# Patient Record
Sex: FEMALE | Race: WHITE | NOT HISPANIC OR LATINO | Employment: UNEMPLOYED | URBAN - METROPOLITAN AREA
[De-identification: names, ages, dates, MRNs, and addresses within clinical notes are randomized per-mention and may not be internally consistent; named-entity substitution may affect disease eponyms.]

---

## 2020-07-17 ENCOUNTER — LAB VISIT (OUTPATIENT)
Dept: PRIMARY CARE CLINIC | Facility: OTHER | Age: 25
End: 2020-07-17
Attending: INTERNAL MEDICINE
Payer: OTHER GOVERNMENT

## 2020-07-17 DIAGNOSIS — Z11.59 SPECIAL SCREENING EXAMINATION FOR UNSPECIFIED VIRAL DISEASE: Primary | ICD-10-CM

## 2020-07-17 PROCEDURE — U0003 INFECTIOUS AGENT DETECTION BY NUCLEIC ACID (DNA OR RNA); SEVERE ACUTE RESPIRATORY SYNDROME CORONAVIRUS 2 (SARS-COV-2) (CORONAVIRUS DISEASE [COVID-19]), AMPLIFIED PROBE TECHNIQUE, MAKING USE OF HIGH THROUGHPUT TECHNOLOGIES AS DESCRIBED BY CMS-2020-01-R: HCPCS

## 2020-07-22 DIAGNOSIS — U07.1 COVID-19 VIRUS DETECTED: ICD-10-CM

## 2020-07-22 LAB — SARS-COV-2 RNA RESP QL NAA+PROBE: POSITIVE

## 2020-07-29 ENCOUNTER — HOSPITAL ENCOUNTER (EMERGENCY)
Facility: HOSPITAL | Age: 25
Discharge: HOME | End: 2020-07-29
Attending: EMERGENCY MEDICINE
Payer: OTHER GOVERNMENT

## 2020-07-29 VITALS
DIASTOLIC BLOOD PRESSURE: 75 MMHG | OXYGEN SATURATION: 100 % | RESPIRATION RATE: 18 BRPM | SYSTOLIC BLOOD PRESSURE: 115 MMHG | BODY MASS INDEX: 33.13 KG/M2 | TEMPERATURE: 99 F | HEIGHT: 63 IN | WEIGHT: 187 LBS | HEART RATE: 76 BPM

## 2020-07-29 DIAGNOSIS — R52 PAIN: ICD-10-CM

## 2020-07-29 DIAGNOSIS — S83.92XA SPRAIN OF LEFT KNEE, UNSPECIFIED LIGAMENT, INITIAL ENCOUNTER: Primary | ICD-10-CM

## 2020-07-29 LAB
B-HCG UR QL: NEGATIVE
CTP QC/QA: YES

## 2020-07-29 PROCEDURE — 25000003 PHARM REV CODE 250: Performed by: EMERGENCY MEDICINE

## 2020-07-29 PROCEDURE — 99285 EMERGENCY DEPT VISIT HI MDM: CPT | Mod: 25

## 2020-07-29 PROCEDURE — 81025 URINE PREGNANCY TEST: CPT | Performed by: EMERGENCY MEDICINE

## 2020-07-29 RX ORDER — MELOXICAM 15 MG/1
15 TABLET ORAL DAILY
Qty: 10 TABLET | Refills: 0 | Status: SHIPPED | OUTPATIENT
Start: 2020-07-29

## 2020-07-29 RX ORDER — IBUPROFEN 400 MG/1
400 TABLET ORAL
Status: COMPLETED | OUTPATIENT
Start: 2020-07-29 | End: 2020-07-29

## 2020-07-29 RX ADMIN — IBUPROFEN 400 MG: 400 TABLET ORAL at 03:07

## 2020-07-29 NOTE — ED TRIAGE NOTES
Pt states since she woke up two days ago, her L knee has been swollen and painful. Had surgery on affected knee 10 years ago.

## 2020-07-29 NOTE — ED PROVIDER NOTES
Encounter Date: 7/29/2020       History     Chief Complaint   Patient presents with    Knee Pain     L knee- denies injury     24-year-old well-appearing female presents to the emergency department with complaint of pain to the left medial aspect of her knee.  Patient denies any known injury.  She recently tested positive for the COVID virus she denies any calf tenderness or swelling.  She has had a previous surgeries the left knee.        Review of patient's allergies indicates:   Allergen Reactions    Morphine Hives    Morpholine analogues      Past Medical History:   Diagnosis Date    Anxiety      No past surgical history on file.  No family history on file.  Social History     Tobacco Use    Smoking status: Current Every Day Smoker     Packs/day: 1.00     Types: Cigarettes   Substance Use Topics    Alcohol use: Yes    Drug use: Not on file     Review of Systems   Musculoskeletal:        Left knee pain    All other systems reviewed and are negative.      Physical Exam     Initial Vitals [07/29/20 1312]   BP Pulse Resp Temp SpO2   (!) 143/62 85 18 98 °F (36.7 °C) 97 %      MAP       --         Physical Exam    Nursing note and vitals reviewed.  Constitutional: She appears well-developed and well-nourished.   Cardiovascular: Normal rate and regular rhythm.   Pulmonary/Chest: Breath sounds normal.   Musculoskeletal:      Left knee: She exhibits swelling. She exhibits normal range of motion, no effusion, no bony tenderness and normal meniscus. Tenderness found. Medial joint line tenderness noted.        Legs:       Comments: Negative Homans   Skin: Skin is warm and dry.         ED Course   Splint Application    Date/Time: 7/29/2020 3:25 PM  Performed by: DELFINO Abdi  Authorized by: Samir Lafleur MD   Location details: left knee  Supplies used: aluminum splint  Post-procedure: The splinted body part was neurovascularly unchanged following the procedure.  Patient tolerance: Patient tolerated the  procedure well with no immediate complications        Labs Reviewed   POCT URINE PREGNANCY          Imaging Results          US Lower Extremity Veins Left (In process)                X-Ray Knee Complete 4 or more Views Left (Final result)  Result time 07/29/20 14:21:05   Procedure changed from X-Ray Knee 3 View Left     Final result by Florentin Queen MD (07/29/20 14:21:05)                 Narrative:    XR KNEE COMP 4 OR MORE VIEWS LEFT    CLINICAL HISTORY:  24 years Female pain    COMPARISON: None    FINDINGS: No acute fracture or malalignment of the left knee. Joint  spaces are maintained. Linear tracks through the tibial tuberosity  consistent with prior fixation hardware placement. No joint effusion.    IMPRESSION:    No acute osseous abnormality.    Postoperative changes of the tibial tuberosity.    Electronically Signed by Florentin MCINTOSH on 7/29/2020 2:24 PM                               Medical Decision Making:   Initial Assessment:   Knee pain   Differential Diagnosis:   DVT , knee sprain  ED Management:  The patient presents emergency department with complaint of pain to the left medial aspect of her knee she has had a previous surgery to the same knee.  Patient has recently tested positive for the COVID virus she denies any calf tenderness and her Homans test is negative however because she has a COVID ultrasound performed which is negative for DVT.  Patient instructed follow up with orthopedist intake Motrin Tylenol for pain she was given detailed return precautions.              Attending Attestation:     Physician Attestation Statement for NP/PA:   I discussed this assessment and plan of this patient with the NP/PA, but I did not personally examine the patient. The face to face encounter was performed by the NP/PA.                  ED Course as of Jul 29 1741 Wed Jul 29, 2020   1530 Patient angry because she cant get a MRI Dr Lafleur asked to see the patient     [MP]      ED Course User  Index  [MP] DELFINO Abdi                Clinical Impression:       ICD-10-CM ICD-9-CM   1. Sprain of left knee, unspecified ligament, initial encounter  S83.92XA 844.9   2. Pain  R52 780.96                                DELFINO Abdi  07/29/20 1500       DELFINO Abdi  07/29/20 1526       DELFINO Abdi  07/29/20 1741       Samir Lafleur MD  07/30/20 6924

## 2020-08-19 ENCOUNTER — HOSPITAL ENCOUNTER (EMERGENCY)
Facility: HOSPITAL | Age: 25
Discharge: HOME | End: 2020-08-19
Attending: EMERGENCY MEDICINE
Payer: OTHER GOVERNMENT

## 2020-08-19 VITALS
DIASTOLIC BLOOD PRESSURE: 94 MMHG | BODY MASS INDEX: 33.66 KG/M2 | WEIGHT: 190 LBS | HEART RATE: 80 BPM | RESPIRATION RATE: 18 BRPM | SYSTOLIC BLOOD PRESSURE: 141 MMHG | TEMPERATURE: 98 F | OXYGEN SATURATION: 98 %

## 2020-08-19 DIAGNOSIS — S93.402A SPRAIN OF LEFT ANKLE, UNSPECIFIED LIGAMENT, INITIAL ENCOUNTER: Primary | ICD-10-CM

## 2020-08-19 DIAGNOSIS — M25.572 LEFT ANKLE PAIN: ICD-10-CM

## 2020-08-19 PROCEDURE — 99283 EMERGENCY DEPT VISIT LOW MDM: CPT | Mod: 25

## 2020-08-19 PROCEDURE — 25000003 PHARM REV CODE 250: Performed by: EMERGENCY MEDICINE

## 2020-08-19 RX ORDER — OXYCODONE HYDROCHLORIDE 10 MG/1
10 TABLET ORAL
Status: COMPLETED | OUTPATIENT
Start: 2020-08-19 | End: 2020-08-19

## 2020-08-19 RX ORDER — DICLOFENAC SODIUM 50 MG/1
50 TABLET, DELAYED RELEASE ORAL 3 TIMES DAILY
Qty: 15 TABLET | Refills: 0 | Status: SHIPPED | OUTPATIENT
Start: 2020-08-19 | End: 2021-08-19

## 2020-08-19 RX ADMIN — OXYCODONE HYDROCHLORIDE 10 MG: 10 TABLET ORAL at 12:08

## 2020-08-19 NOTE — ED NOTES
presents to room 6 with complaints of left ankle and foot pain sfter fall No obvious deformities swelling or discoloration not Examined by Dr Lindo X ray at bedside NAD noted

## 2020-08-19 NOTE — ED PROVIDER NOTES
Encounter Date: 8/19/2020    SCRIBE #1 NOTE: I, La Perez, am scribing for, and in the presence of, Ismael Lindo MD.       History     Chief Complaint   Patient presents with    Ankle Injury     S/p trip and fall; presents with left ankle pain     Time seen by provider: 12:24 PM on 08/19/2020    Chief complaint: Ankle injury    Stephanie Moore is a 24 y.o. female who presents to the ED with an onset of ankle injury. The patient complains of pain to her left ankle secondary to tripping and falling onto her ankle while at work [employer]. Patient admits she was wearing high-heeled shoes. She also endures pain in her leg knee but states she sprained it a month ago. The patient denies any other symptoms at this time. PMHx of anxiety. No pertinent PSHx.    The history is provided by the patient.     Review of patient's allergies indicates:   Allergen Reactions    Morphine Hives    Morpholine analogues      Past Medical History:   Diagnosis Date    Anxiety      No past surgical history on file.  No family history on file.  Social History     Tobacco Use    Smoking status: Current Every Day Smoker     Packs/day: 1.00     Types: Cigarettes   Substance Use Topics    Alcohol use: Yes    Drug use: Not on file     Review of Systems   Constitutional: Negative for activity change, appetite change, chills, fatigue and fever.   Eyes: Negative for visual disturbance.   Respiratory: Negative for apnea and shortness of breath.    Cardiovascular: Negative for chest pain and palpitations.   Gastrointestinal: Negative for abdominal distention and abdominal pain.   Genitourinary: Negative for difficulty urinating.   Musculoskeletal: Positive for arthralgias. Negative for neck pain.   Skin: Negative for pallor and rash.   Neurological: Negative for headaches.   Hematological: Does not bruise/bleed easily.   Psychiatric/Behavioral: Negative for agitation.       Physical Exam     Initial Vitals [08/19/20 1222]   BP Pulse Resp Temp  SpO2   (!) 141/94 80 16 97.6 °F (36.4 °C) 98 %      MAP       --         Physical Exam    Nursing note and vitals reviewed.  Constitutional: She appears well-developed and well-nourished.   HENT:   Head: Normocephalic and atraumatic.   Eyes: Conjunctivae are normal.   Neck: Normal range of motion. Neck supple.   Cardiovascular: Normal rate, regular rhythm and normal heart sounds. Exam reveals no gallop and no friction rub.    No murmur heard.  Pulmonary/Chest: Effort normal and breath sounds normal. No respiratory distress. She has no wheezes. She has no rhonchi. She has no rales.   Abdominal: Soft. She exhibits no distension. There is no abdominal tenderness.   Musculoskeletal: Normal range of motion.        Left ankle: She exhibits swelling. Tenderness. Lateral malleolus tenderness found.      Comments: Tenderness with mild swelling of left lateral malleolus.   Neurological: She is alert and oriented to person, place, and time.   Skin: Skin is warm and dry. No erythema.   Psychiatric: She has a normal mood and affect.         ED Course   Procedures  Labs Reviewed - No data to display       Imaging Results          X-Ray Ankle Complete Left (Final result)  Result time 08/19/20 12:43:58    Final result by Loida Fierro MD (08/19/20 12:43:58)                 Impression:      As above      Electronically signed by: Loida Fierro MD  Date:    08/19/2020  Time:    12:43             Narrative:    EXAMINATION:  XR ANKLE COMPLETE 3 VIEW LEFT    CLINICAL HISTORY:  Pain in left ankle and joints of left foot    TECHNIQUE:  AP, lateral and oblique views of the left ankle were performed.    COMPARISON:  None    FINDINGS:  No acute fracture or dislocation left ankle.                                 Medical Decision Making:   History:   Old Medical Records: I decided to obtain old medical records.  Clinical Tests:   Radiological Study: Ordered and Reviewed  ED Management:  24-year-old female presents with left ankle pain  after a fall.  X-rays independently interpreted by me failed to demonstrate any evidence of fracture or dislocation.  She is placed in a walking boot.       APC / Resident Notes:   I, Dr. Ismael Lindo III, personally performed the services described in this documentation. All medical record entries made by the scribe were at my direction and in my presence.  I have reviewed the chart and agree that the record reflects my personal performance and is accurate and complete       Scribe Attestation:   Scribe #1: I performed the above scribed service and the documentation accurately describes the services I performed. I attest to the accuracy of the note.                          Clinical Impression:       ICD-10-CM ICD-9-CM   1. Left ankle pain  M25.572 719.47         Disposition:   Disposition: Discharged  Condition: Stable                        Ismael Lindo III, MD  08/19/20 0032

## 2021-04-29 ENCOUNTER — PATIENT MESSAGE (OUTPATIENT)
Dept: RESEARCH | Facility: HOSPITAL | Age: 26
End: 2021-04-29

## 2021-07-01 ENCOUNTER — HOSPITAL ENCOUNTER (EMERGENCY)
Facility: HOSPITAL | Age: 26
Discharge: HOME | End: 2021-07-01
Attending: EMERGENCY MEDICINE

## 2021-07-01 VITALS
DIASTOLIC BLOOD PRESSURE: 94 MMHG | WEIGHT: 200 LBS | HEIGHT: 63 IN | OXYGEN SATURATION: 99 % | BODY MASS INDEX: 35.44 KG/M2 | TEMPERATURE: 99 F | RESPIRATION RATE: 18 BRPM | SYSTOLIC BLOOD PRESSURE: 139 MMHG | HEART RATE: 95 BPM

## 2021-07-01 DIAGNOSIS — M25.461 EFFUSION OF RIGHT KNEE: Primary | ICD-10-CM

## 2021-07-01 DIAGNOSIS — R52 PAIN: ICD-10-CM

## 2021-07-01 LAB
B-HCG UR QL: NEGATIVE
CTP QC/QA: YES

## 2021-07-01 PROCEDURE — 99283 EMERGENCY DEPT VISIT LOW MDM: CPT

## 2021-07-01 PROCEDURE — 25000003 PHARM REV CODE 250: Performed by: NURSE PRACTITIONER

## 2021-07-01 PROCEDURE — 81025 URINE PREGNANCY TEST: CPT | Performed by: NURSE PRACTITIONER

## 2021-07-01 RX ORDER — HYDROCODONE BITARTRATE AND ACETAMINOPHEN 5; 325 MG/1; MG/1
1 TABLET ORAL
Status: COMPLETED | OUTPATIENT
Start: 2021-07-01 | End: 2021-07-01

## 2021-07-01 RX ORDER — HYDROCODONE BITARTRATE AND ACETAMINOPHEN 5; 325 MG/1; MG/1
1 TABLET ORAL EVERY 4 HOURS PRN
Qty: 15 TABLET | Refills: 0 | Status: SHIPPED | OUTPATIENT
Start: 2021-07-01

## 2021-07-01 RX ADMIN — HYDROCODONE BITARTRATE AND ACETAMINOPHEN 1 TABLET: 5; 325 TABLET ORAL at 08:07

## 2024-01-03 DIAGNOSIS — Z32.01 ENCOUNTER FOR PREGNANCY TEST, RESULT POSITIVE: Primary | ICD-10-CM

## 2024-01-04 ENCOUNTER — HOSPITAL ENCOUNTER (EMERGENCY)
Facility: HOSPITAL | Age: 29
Discharge: HOME | End: 2024-01-04
Attending: EMERGENCY MEDICINE
Payer: MEDICAID

## 2024-01-04 VITALS
WEIGHT: 207 LBS | HEIGHT: 63 IN | RESPIRATION RATE: 16 BRPM | BODY MASS INDEX: 36.68 KG/M2 | TEMPERATURE: 99 F | SYSTOLIC BLOOD PRESSURE: 130 MMHG | HEART RATE: 80 BPM | DIASTOLIC BLOOD PRESSURE: 80 MMHG | OXYGEN SATURATION: 98 %

## 2024-01-04 DIAGNOSIS — B34.9 VIRAL SYNDROME: Primary | ICD-10-CM

## 2024-01-04 DIAGNOSIS — R10.9 ABDOMINAL PAIN: ICD-10-CM

## 2024-01-04 DIAGNOSIS — Z34.92 SECOND TRIMESTER PREGNANCY: ICD-10-CM

## 2024-01-04 LAB
ALBUMIN SERPL BCP-MCNC: 3.6 G/DL (ref 3.5–5.2)
ALP SERPL-CCNC: 122 U/L (ref 55–135)
ALT SERPL W/O P-5'-P-CCNC: 37 U/L (ref 10–44)
ANION GAP SERPL CALC-SCNC: 11 MMOL/L (ref 8–16)
AST SERPL-CCNC: 24 U/L (ref 10–40)
B-HCG UR QL: POSITIVE
BASOPHILS # BLD AUTO: 0.02 K/UL (ref 0–0.2)
BASOPHILS NFR BLD: 0.2 % (ref 0–1.9)
BILIRUB SERPL-MCNC: 0.3 MG/DL (ref 0.1–1)
BILIRUB UR QL STRIP: NEGATIVE
BUN SERPL-MCNC: 5 MG/DL (ref 6–20)
CALCIUM SERPL-MCNC: 9.1 MG/DL (ref 8.7–10.5)
CHLORIDE SERPL-SCNC: 104 MMOL/L (ref 95–110)
CLARITY UR: CLEAR
CO2 SERPL-SCNC: 19 MMOL/L (ref 23–29)
COLOR UR: YELLOW
CREAT SERPL-MCNC: 0.5 MG/DL (ref 0.5–1.4)
CTP QC/QA: YES
DIFFERENTIAL METHOD BLD: ABNORMAL
EOSINOPHIL # BLD AUTO: 0 K/UL (ref 0–0.5)
EOSINOPHIL NFR BLD: 0.2 % (ref 0–8)
ERYTHROCYTE [DISTWIDTH] IN BLOOD BY AUTOMATED COUNT: 13.7 % (ref 11.5–14.5)
EST. GFR  (NO RACE VARIABLE): >60 ML/MIN/1.73 M^2
FIBRONECTIN FETAL SPEC QL: NEGATIVE
GLUCOSE SERPL-MCNC: 121 MG/DL (ref 70–110)
GLUCOSE UR QL STRIP: NEGATIVE
HCG INTACT+B SERPL-ACNC: NORMAL MIU/ML
HCT VFR BLD AUTO: 33.3 % (ref 37–48.5)
HGB BLD-MCNC: 11.4 G/DL (ref 12–16)
HGB UR QL STRIP: NEGATIVE
IMM GRANULOCYTES # BLD AUTO: 0.09 K/UL (ref 0–0.04)
IMM GRANULOCYTES NFR BLD AUTO: 1 % (ref 0–0.5)
INFLUENZA A, MOLECULAR: NEGATIVE
INFLUENZA B, MOLECULAR: NEGATIVE
KETONES UR QL STRIP: NEGATIVE
LEUKOCYTE ESTERASE UR QL STRIP: NEGATIVE
LIPASE SERPL-CCNC: 6 U/L (ref 4–60)
LYMPHOCYTES # BLD AUTO: 0.8 K/UL (ref 1–4.8)
LYMPHOCYTES NFR BLD: 8.3 % (ref 18–48)
MCH RBC QN AUTO: 30.5 PG (ref 27–31)
MCHC RBC AUTO-ENTMCNC: 34.2 G/DL (ref 32–36)
MCV RBC AUTO: 89 FL (ref 82–98)
MONOCYTES # BLD AUTO: 0.7 K/UL (ref 0.3–1)
MONOCYTES NFR BLD: 7 % (ref 4–15)
NEUTROPHILS # BLD AUTO: 7.7 K/UL (ref 1.8–7.7)
NEUTROPHILS NFR BLD: 83.3 % (ref 38–73)
NITRITE UR QL STRIP: NEGATIVE
NRBC BLD-RTO: 0 /100 WBC
PH UR STRIP: 7 [PH] (ref 5–8)
PLATELET # BLD AUTO: 280 K/UL (ref 150–450)
PMV BLD AUTO: 10.2 FL (ref 9.2–12.9)
POTASSIUM SERPL-SCNC: 3.6 MMOL/L (ref 3.5–5.1)
PROT SERPL-MCNC: 6.7 G/DL (ref 6–8.4)
PROT UR QL STRIP: NEGATIVE
RBC # BLD AUTO: 3.74 M/UL (ref 4–5.4)
RH BLD: NORMAL
SARS-COV-2 RDRP RESP QL NAA+PROBE: NEGATIVE
SODIUM SERPL-SCNC: 134 MMOL/L (ref 136–145)
SP GR UR STRIP: 1.01 (ref 1–1.03)
SPECIMEN SOURCE: NORMAL
URN SPEC COLLECT METH UR: NORMAL
UROBILINOGEN UR STRIP-ACNC: NEGATIVE EU/DL
WBC # BLD AUTO: 9.29 K/UL (ref 3.9–12.7)

## 2024-01-04 PROCEDURE — 96361 HYDRATE IV INFUSION ADD-ON: CPT

## 2024-01-04 PROCEDURE — 86901 BLOOD TYPING SEROLOGIC RH(D): CPT | Performed by: EMERGENCY MEDICINE

## 2024-01-04 PROCEDURE — U0002 COVID-19 LAB TEST NON-CDC: HCPCS | Performed by: EMERGENCY MEDICINE

## 2024-01-04 PROCEDURE — 36415 COLL VENOUS BLD VENIPUNCTURE: CPT | Performed by: EMERGENCY MEDICINE

## 2024-01-04 PROCEDURE — 83690 ASSAY OF LIPASE: CPT | Performed by: EMERGENCY MEDICINE

## 2024-01-04 PROCEDURE — 85025 COMPLETE CBC W/AUTO DIFF WBC: CPT | Performed by: EMERGENCY MEDICINE

## 2024-01-04 PROCEDURE — 84702 CHORIONIC GONADOTROPIN TEST: CPT | Performed by: EMERGENCY MEDICINE

## 2024-01-04 PROCEDURE — 81025 URINE PREGNANCY TEST: CPT | Performed by: EMERGENCY MEDICINE

## 2024-01-04 PROCEDURE — 99284 EMERGENCY DEPT VISIT MOD MDM: CPT | Mod: 25

## 2024-01-04 PROCEDURE — 87502 INFLUENZA DNA AMP PROBE: CPT | Performed by: EMERGENCY MEDICINE

## 2024-01-04 PROCEDURE — 81003 URINALYSIS AUTO W/O SCOPE: CPT | Performed by: EMERGENCY MEDICINE

## 2024-01-04 PROCEDURE — 82731 ASSAY OF FETAL FIBRONECTIN: CPT | Performed by: OBSTETRICS & GYNECOLOGY

## 2024-01-04 PROCEDURE — 63600175 PHARM REV CODE 636 W HCPCS: Performed by: EMERGENCY MEDICINE

## 2024-01-04 PROCEDURE — 96360 HYDRATION IV INFUSION INIT: CPT

## 2024-01-04 PROCEDURE — 80053 COMPREHEN METABOLIC PANEL: CPT | Performed by: EMERGENCY MEDICINE

## 2024-01-04 RX ADMIN — SODIUM CHLORIDE, SODIUM LACTATE, POTASSIUM CHLORIDE, AND CALCIUM CHLORIDE 1000 ML: .6; .31; .03; .02 INJECTION, SOLUTION INTRAVENOUS at 11:01

## 2024-01-04 NOTE — ED PROVIDER NOTES
"Encounter Date: 1/4/2024       History     Chief Complaint   Patient presents with    Fever     Fever, cough, runny nose starting today, pregnant but unknown gestation     Abdominal Pain     Abd pain starting today     Patient complains of pains to the lower abdomen, has a fluctuating fever to 102, cough and runny nose, has vomited only once today, no hx of dvt, PE. Denies chest pain, has slight chest discomfort, is uncomfortable, no chest pain, "it doesn't hurt"        Review of patient's allergies indicates:   Allergen Reactions    Morphine Hives    Morpholine analogues      Past Medical History:   Diagnosis Date    Anxiety      No past surgical history on file.  No family history on file.  Social History     Tobacco Use    Smoking status: Every Day     Current packs/day: 1.00     Types: Cigarettes   Substance Use Topics    Alcohol use: Yes     Review of Systems   Constitutional:  Negative for chills and fever.   HENT:  Negative for ear pain, rhinorrhea and sore throat.    Eyes:  Negative for pain and visual disturbance.   Respiratory:  Positive for cough and shortness of breath. Negative for chest tightness.    Cardiovascular:  Negative for chest pain, palpitations and leg swelling.   Gastrointestinal:  Positive for abdominal pain. Negative for constipation, diarrhea, nausea and vomiting.   Genitourinary:  Negative for difficulty urinating, dysuria, frequency, hematuria and urgency.   Musculoskeletal:  Negative for back pain, joint swelling and myalgias.   Skin:  Negative for color change and rash.   Neurological:  Negative for dizziness, seizures, weakness and headaches.   Hematological:  Does not bruise/bleed easily.   Psychiatric/Behavioral:  Negative for dysphoric mood. The patient is not nervous/anxious.        Physical Exam     Initial Vitals [01/04/24 0108]   BP Pulse Resp Temp SpO2   (!) 145/87 (!) 125 18 99.3 °F (37.4 °C) 98 %      MAP       --         Physical Exam    Nursing note and vitals " reviewed.  Constitutional: She appears well-developed and well-nourished.   HENT:   Head: Normocephalic and atraumatic.   Eyes: Conjunctivae, EOM and lids are normal. Pupils are equal, round, and reactive to light.   Neck: Trachea normal. Neck supple. No thyroid mass and no thyromegaly present.   Normal range of motion.  Cardiovascular:  Normal rate, regular rhythm and normal heart sounds.           Pulmonary/Chest: Effort normal and breath sounds normal.   Abdominal: Abdomen is soft. There is no abdominal tenderness.   Musculoskeletal:         General: Normal range of motion.      Cervical back: Normal range of motion and neck supple.     Neurological: She is alert and oriented to person, place, and time. She has normal strength and normal reflexes. No cranial nerve deficit or sensory deficit.   Skin: Skin is warm and dry.   Psychiatric: She has a normal mood and affect. Her speech is normal and behavior is normal. Judgment and thought content normal.         ED Course   Procedures  Labs Reviewed   CBC W/ AUTO DIFFERENTIAL - Abnormal; Notable for the following components:       Result Value    RBC 3.74 (*)     Hemoglobin 11.4 (*)     Hematocrit 33.3 (*)     Immature Granulocytes 1.0 (*)     Immature Grans (Abs) 0.09 (*)     Lymph # 0.8 (*)     Gran % 83.3 (*)     Lymph % 8.3 (*)     All other components within normal limits   COMPREHENSIVE METABOLIC PANEL - Abnormal; Notable for the following components:    Sodium 134 (*)     CO2 19 (*)     Glucose 121 (*)     BUN 5 (*)     All other components within normal limits   POCT URINE PREGNANCY - Abnormal; Notable for the following components:    POC Preg Test, Ur Positive (*)     All other components within normal limits   INFLUENZA A AND B ANTIGEN    Narrative:     Specimen Source->Nasopharyngeal Swab   SARS-COV-2 RNA AMPLIFICATION, QUAL   URINALYSIS, REFLEX TO URINE CULTURE    Narrative:     Specimen Source->Urine   LIPASE   HCG, QUANTITATIVE   HCG, QUANTITATIVE    FETAL FIBRONECTIN    Narrative:     Gestational Age: Weeks/Day->24.3   RH TYPING          Imaging Results              US OB 14+ Wks, TransAbd, Single Gestation (Final result)  Result time 01/04/24 07:50:07   Procedure changed from US OB <14 Wks, TransAbd, Single Gestation     Final result by Parminder Rivas MD (01/04/24 07:50:07)                   Narrative:    Ultrasound pregnancy limited    CLINICAL DATA: Pregnancy, abdominal pain    FINDINGS: Sonographic assessment of the gravid uterus was performed utilizing transabdominal technique.    Single living intrauterine fetus is currently in breech presentation with spontaneous fetal motion and fetal heart rate of 162 bpm. The placenta is posterior with no evidence of previa or abruption. The cervix is closed and measures 4.3 cm in length. Amniotic fluid index is 13.1 cm.    By sonographic criteria, estimated gestational age is 24 weeks 3 days +/- 1 week 5 days. Detailed morphologic survey was not performed.    The maternal ovaries are normal in appearance. There is no pelvic free fluid.    IMPRESSION:  1. Living intrauterine fetus in breech presentation. Estimated gestational age 24 weeks 3 days.  2. Posterior placenta with no previa or abruption.    Electronically signed by:  Parminder Rivas MD  01/04/2024 07:50 AM CST Workstation: [id number]                                     Medications   lactated ringers bolus 1,000 mL (1,000 mLs Intravenous New Bag 1/4/24 1123)     Medical Decision Making  Amount and/or Complexity of Data Reviewed  Labs: ordered.  Radiology: ordered.               ED Course as of 01/04/24 1418   Thu Jan 04, 2024   1411 Patient seen evaluated emergency department.  Patient initially here with complaint of abdominal cramping.  Patient found to be pregnant however stated that she did not know her last menstrual period.  Patient workup in emergency department found with 24 week 5 day single live intrauterine pregnancy with breech position.   Patient stated that she had flu-like symptoms.  Found to be COVID and influenza negative.  Patient was Rh positive.  Was placed on fetal monitor which showed no evidence of active labor.  Fetal fibronectin found to be negative.  Patient did receive IV hydration emergency department.  Currently at this time patient to be discharged with follow-up to [facility] program.  Patient instructed to drink plenty of fluids.  She is return if problems persist worsens or additional. [RM]      ED Course User Index  [RM] Edson Mane MD                           Clinical Impression:  Final diagnoses:  [R10.9] Abdominal pain  [B34.9] Viral syndrome (Primary)  [Z34.92] Second trimester pregnancy          ED Disposition Condition    Discharge Stable          ED Prescriptions    None       Follow-up Information       Follow up With Specialties Details Why Contact Info    Alma Delia Rojas MD Obstetrics, Obstetrics and Gynecology Schedule an appointment as soon as possible for a visit in 1 week For recheck/continuing care [address]  [address]  [facility]  [facility] [address]  [phone]               Edson Mane MD  01/04/24 9445

## 2024-01-11 LAB
HIV 1+2 AB+HIV1 P24 AG SERPL QL IA: NORMAL
RPR: NON REACTIVE
RUBELLA IMMUNE STATUS: NORMAL

## 2024-03-13 ENCOUNTER — NUTRITION (OUTPATIENT)
Dept: NUTRITION | Facility: HOSPITAL | Age: 29
End: 2024-03-13
Attending: OBSTETRICS & GYNECOLOGY
Payer: MEDICAID

## 2024-03-13 DIAGNOSIS — O24.419 GESTATIONAL DIABETES MELLITUS (GDM), ANTEPARTUM, GESTATIONAL DIABETES METHOD OF CONTROL UNSPECIFIED: Primary | ICD-10-CM

## 2024-03-13 PROCEDURE — G0108 DIAB MANAGE TRN  PER INDIV: HCPCS

## 2024-03-13 NOTE — PROGRESS NOTES
"Diagnosis/Reason for Referral: Gestational Diabetes    Medical Nutrition Prescription: Diabetes Self Management Training        Referring professional: Dr. Dias    Anthropometrics  Height:   Ht Readings from Last 1 Encounters:   01/04/24 5' 3" (1.6 m)     Weight:   Wt Readings from Last 1 Encounters:   01/04/24 93.9 kg (207 lb)      BMI:   BMI Readings from Last 1 Encounters:   01/04/24 36.67 kg/m²        Weight History:  Wt Readings from Last 5 Encounters:   01/04/24 93.9 kg (207 lb)   07/01/21 90.7 kg (200 lb)   08/19/20 86.2 kg (190 lb)   07/29/20 84.8 kg (187 lb)   02/17/16 72.6 kg (160 lb)        Caloric needs: 7031-8942 (20-25kcal/kg) + 500 kcals for pregnancy  Protein needs: 75-98gm (0.8-1gm/kg)     Potential food/medication interaction: none, only taking iron and prenatal    Support system: family    Lifestyle/cultural/family influence: none    NFPE: N/A    Social Determinants of Health: SDOH: N/A    Diabetes Care Management Summary   Diabetes Education Record Assessment/Progress Initial   Current Diabetes Risk Level Low   Diabetes Type   Diabetes Type  Gestational   Diabetes History   Diabetes Diagnosis 0-1 year   Current Treatment Diet   Nutrition   Meal Planning water;3 meals per day;diet drinks;eats out seldom   What type of sweetener do you use? none   What type of beverages do you drink? diet soda/tea;water   Meal Plan 24 Hour Recall - Breakfast cabbage and sausage   Meal Plan 24 Hour Recall - Lunch cabbage and sausage   Meal Plan 24 Hour Recall - Dinner rice and beans   Meal Plan 24 Hour Recall - Snack Beef jerky   Monitoring    Self Monitoring  yes   Blood Glucose Logs Yes   Do you use a personal continuous glucose monitor? No   In the last month, how often have you had a low blood sugar reaction? never   Exercise    Exercise Type none   Social History   Preferred Learning Method Face to Face;Video   Primary Support Self;Spouse   Smoking Status Ex Smoker   Barriers to Change   Barriers to Change " None   Learning Challenges  None   Readiness to Learn    Readiness to Learn  Eager   Diabetes Education Assessment/Progress   Diabetes Disease Process (diabetes disease process and treatment options) Instructed/patient voiced/demonstrated understanding/Written Materials provided   Nutrition (Incorporating nutritional management into one's lifestyle) Instructed/patient voiced/demonstrated understanding/Written Materials provided   Physical Activity (incorporating physical activity into one's lifestyle) Instructed/patient voiced/demonstrated understanding/Written Materials provided   Monitoring (monitoring blood glucose/other parameters & using results) Instructed/patient voiced/demonstrated understanding/Written Materials provided   Acute Complications (preventing, detecting, and treating acute complications) Instructed/patient voiced/demonstrated understanding/Written Materials provided   Goals   Patient has selected/evaluated goals during today's session Yes, selected   Healthy Eating Set   Start Date 03/13/24   Monitoring Set   Start Date 03/13/24   Problem Solving Set   Start Date 03/13/24   Diabetes Care Plan/Intervention   Education Plan/Intervention In F/U DSMT   Diabetes Meal Plan   Restrictions Restricted Carbohydrate   Carbohydrate Per Meal 30-45g   Carbohydrate Per Snack  15-20g   Education Units of Time    Time Spent 60 min       Instructions Provided:   Definition and Diagnosis    Nutrition and Meal Planning    Support Plan/Coping Skills  Food label reading  Carbohydrate counting  Low carb snacks   Blood sugar monitoring  Hypoglycemia Management  Sick day guidelines  Sharps disposal     Comments:    I reviewed all of the above with patient. Patient has followed a low carb diet in the past and is familiar with carbohydrates. RD reviewed patient's blood sugar logs, fasting is fluctuating between 87-130mgd/L. All post prandial results are <150mg/dL. We discussed adding a bedtime snack, increasing physical  activity after meals and checking blood sugar at the same time every morning. Encouraged patient to make notes of routine the night before, snacks, waking up frequently during the night, eating during the night and dehydration since all these can be affect her fasting blood sugar. Low physical activity d/t pain and feeling tired.     We also discussed eating 30-45 gm of carbohydrates per meal and 15-20gm carbohydrates per snack and not avoiding carbohydrates during pregnancy. Encouraged patient to continue to check blood sugar 4x/day and adjust carbohydrate intake accordingly.     Education materials and contact information provided for questions.     Nutrition Diagnosis PES Statement: Food- and Nutrition-related knowledge deficit related to lack of prior exposure to accurate nutritionrelated information as evidenced by new diagnosis of gestational diabetes    Motivation: high     Goals:    1. Eat 30-45gm per meal and 15-20gm per snack of carbohydrates  2. Test blood sugars 4x/day and keep blood sugar log  3. Read food labels    Thank you for the referral.      Mago Sargent, MS, RD/LDN, CDCES    Note faxed to MD

## 2024-03-20 ENCOUNTER — TELEPHONE (OUTPATIENT)
Dept: NUTRITION | Facility: HOSPITAL | Age: 29
End: 2024-03-20

## 2024-03-20 NOTE — TELEPHONE ENCOUNTER
RD called patient to follow up on fasting blood sugars.     Patient reports that most of her fasting blood sugar results are <100mg/dL. She is doing well with her meals, just exhausted and feels that it is likely d/t being close to her delivery date.     No concerns at this time.     Mago Sargent RD 03/20/2024 2:07 PM

## 2024-03-21 LAB — PRENATAL STREP B CULTURE: NEGATIVE

## 2024-04-12 ENCOUNTER — OFFICE VISIT (OUTPATIENT)
Dept: URGENT CARE | Facility: CLINIC | Age: 29
End: 2024-04-12
Payer: MEDICAID

## 2024-04-12 VITALS
TEMPERATURE: 98 F | DIASTOLIC BLOOD PRESSURE: 89 MMHG | WEIGHT: 210 LBS | OXYGEN SATURATION: 98 % | RESPIRATION RATE: 18 BRPM | HEART RATE: 67 BPM | HEIGHT: 63 IN | BODY MASS INDEX: 37.21 KG/M2 | SYSTOLIC BLOOD PRESSURE: 137 MMHG

## 2024-04-12 DIAGNOSIS — J06.9 VIRAL URI WITH COUGH: Primary | ICD-10-CM

## 2024-04-12 DIAGNOSIS — Z20.822 COVID-19 VIRUS NOT DETECTED: ICD-10-CM

## 2024-04-12 DIAGNOSIS — J02.9 SORE THROAT: ICD-10-CM

## 2024-04-12 PROCEDURE — 99204 OFFICE O/P NEW MOD 45 MIN: CPT | Mod: S$GLB,,,

## 2024-04-12 NOTE — PROGRESS NOTES
"Subjective:      Patient ID: Stephanie Moore is a 28 y.o. female.    Vitals:  height is 5' 3" (1.6 m) and weight is 95.3 kg (210 lb). Her temperature is 98.3 °F (36.8 °C). Her blood pressure is 137/89 and her pulse is 67. Her respiration is 18 and oxygen saturation is 98%.     Chief Complaint: Sore Throat    Patient was clinic with a chief complaint of sore throat and cough for 2 days.  She states she wants to be treated for an infection prior to impending fetal delivery in 5 days.  Denies fever or ill contacts.  States gets frequent sinusitis and has associated nausea with sinusitis.  He was she has no facial pains, fever, rust colored mucus, or prolonged duration of illness.  Oropharynx noninfectious appearing.  No oropharyngeal exudate.  No cervical lymphadenopathy.  Tonsils are surgically absent.  She is reporting submandibular gland swelling.  Glands are symmetrical bilaterally.  Do not suspect sialadenitis    Sore Throat   This is a new problem. The current episode started in the past 7 days. The problem has been gradually worsening. There has been no fever. The pain is at a severity of 6/10. The pain is moderate. Associated symptoms include congestion, coughing, a plugged ear sensation, swollen glands and trouble swallowing. The treatment provided no relief.       Constitution: Negative for fever.   HENT:  Positive for congestion, sore throat and trouble swallowing. Negative for postnasal drip and sinus pain.    Neck: Negative for painful lymph nodes.   Cardiovascular: Negative.    Eyes: Negative.    Respiratory:  Positive for cough and sputum production (Green).    Gastrointestinal: Negative.    Endocrine: negative.   Genitourinary:  Positive for frequency and urgency.        Due to pregnancy   Musculoskeletal: Negative.    Skin: Negative.    Hematologic/Lymphatic: Negative for swollen lymph nodes.   Psychiatric/Behavioral: Negative.        Objective:     Physical Exam   Constitutional: She is oriented " to person, place, and time. She appears well-developed. She is cooperative.   HENT:   Head: Normocephalic and atraumatic.   Ears:   Right Ear: Hearing, tympanic membrane, external ear and ear canal normal.   Left Ear: Hearing, tympanic membrane, external ear and ear canal normal.   Nose: Nose normal. No mucosal edema or nasal deformity. No epistaxis. Right sinus exhibits no maxillary sinus tenderness and no frontal sinus tenderness. Left sinus exhibits no maxillary sinus tenderness and no frontal sinus tenderness.   Mouth/Throat: Uvula is midline, oropharynx is clear and moist and mucous membranes are normal. Mucous membranes are moist. No trismus in the jaw. Normal dentition. No uvula swelling. No oropharyngeal exudate or posterior oropharyngeal erythema. Oropharynx is clear.   Eyes: Conjunctivae and lids are normal. Pupils are equal, round, and reactive to light. Extraocular movement intact   Neck: Trachea normal and phonation normal. Neck supple.       Cardiovascular: Normal rate, regular rhythm, normal heart sounds and normal pulses.   Pulmonary/Chest: Effort normal and breath sounds normal.   Abdominal: Normal appearance.      Comments:  abdomen   Musculoskeletal: Normal range of motion.         General: Normal range of motion.   Lymphadenopathy:     She has no cervical adenopathy.   Neurological: no focal deficit. She is alert, oriented to person, place, and time and at baseline. She exhibits normal muscle tone.   Skin: Skin is warm, dry and intact. Capillary refill takes 2 to 3 seconds.   Psychiatric: Her speech is normal and behavior is normal. Mood, judgment and thought content normal.   Nursing note and vitals reviewed.      Assessment:     1. Viral URI with cough    2. Sore throat    3. COVID-19 virus not detected        Plan:       Viral URI with cough    Sore throat  -     POCT rapid strep A  -     SARS Coronavirus 2 Antigen, POCT Manual Read    COVID-19 virus not detected      Conservative  treatments.  No indication for antibiotics at this time.     Rapid strep negative

## 2024-04-17 ENCOUNTER — HOSPITAL ENCOUNTER (INPATIENT)
Facility: HOSPITAL | Age: 29
LOS: 3 days | Discharge: HOME | End: 2024-04-20
Attending: OBSTETRICS & GYNECOLOGY | Admitting: OBSTETRICS & GYNECOLOGY
Payer: MEDICAID

## 2024-04-17 ENCOUNTER — ANESTHESIA (OUTPATIENT)
Dept: OBSTETRICS AND GYNECOLOGY | Facility: HOSPITAL | Age: 29
End: 2024-04-17
Payer: MEDICAID

## 2024-04-17 ENCOUNTER — ANESTHESIA EVENT (OUTPATIENT)
Dept: OBSTETRICS AND GYNECOLOGY | Facility: HOSPITAL | Age: 29
End: 2024-04-17
Payer: MEDICAID

## 2024-04-17 DIAGNOSIS — Z34.90 PREGNANCY: ICD-10-CM

## 2024-04-17 DIAGNOSIS — Z30.2 STERILIZATION: ICD-10-CM

## 2024-04-17 LAB
BASOPHILS # BLD AUTO: 0.03 K/UL (ref 0–0.2)
BASOPHILS NFR BLD: 0.2 % (ref 0–1.9)
DIFFERENTIAL METHOD BLD: ABNORMAL
EOSINOPHIL # BLD AUTO: 0 K/UL (ref 0–0.5)
EOSINOPHIL NFR BLD: 0.1 % (ref 0–8)
ERYTHROCYTE [DISTWIDTH] IN BLOOD BY AUTOMATED COUNT: 14.3 % (ref 11.5–14.5)
HCT VFR BLD AUTO: 32.4 % (ref 37–48.5)
HGB BLD-MCNC: 10.5 G/DL (ref 12–16)
IMM GRANULOCYTES # BLD AUTO: 0.08 K/UL (ref 0–0.04)
IMM GRANULOCYTES NFR BLD AUTO: 0.5 % (ref 0–0.5)
LYMPHOCYTES # BLD AUTO: 1.2 K/UL (ref 1–4.8)
LYMPHOCYTES NFR BLD: 7.1 % (ref 18–48)
MCH RBC QN AUTO: 28.2 PG (ref 27–31)
MCHC RBC AUTO-ENTMCNC: 32.4 G/DL (ref 32–36)
MCV RBC AUTO: 87 FL (ref 82–98)
MONOCYTES # BLD AUTO: 0.6 K/UL (ref 0.3–1)
MONOCYTES NFR BLD: 3.2 % (ref 4–15)
NEUTROPHILS # BLD AUTO: 15.4 K/UL (ref 1.8–7.7)
NEUTROPHILS NFR BLD: 88.9 % (ref 38–73)
NRBC BLD-RTO: 0 /100 WBC
PLATELET # BLD AUTO: 325 K/UL (ref 150–450)
PMV BLD AUTO: 11 FL (ref 9.2–12.9)
RBC # BLD AUTO: 3.73 M/UL (ref 4–5.4)
WBC # BLD AUTO: 17.25 K/UL (ref 3.9–12.7)

## 2024-04-17 PROCEDURE — 37000009 HC ANESTHESIA EA ADD 15 MINS: Performed by: OBSTETRICS & GYNECOLOGY

## 2024-04-17 PROCEDURE — 51702 INSERT TEMP BLADDER CATH: CPT

## 2024-04-17 PROCEDURE — 63600175 PHARM REV CODE 636 W HCPCS: Mod: JZ,JG | Performed by: OBSTETRICS & GYNECOLOGY

## 2024-04-17 PROCEDURE — 36004725 HC OB OR TIME LEV III - EA ADD 15 MIN: Mod: SZN

## 2024-04-17 PROCEDURE — 63600175 PHARM REV CODE 636 W HCPCS: Performed by: OBSTETRICS & GYNECOLOGY

## 2024-04-17 PROCEDURE — 59514 CESAREAN DELIVERY ONLY: CPT | Mod: QX,CRNA,, | Performed by: NURSE ANESTHETIST, CERTIFIED REGISTERED

## 2024-04-17 PROCEDURE — 27201423 OPTIME MED/SURG SUP & DEVICES STERILE SUPPLY: Performed by: OBSTETRICS & GYNECOLOGY

## 2024-04-17 PROCEDURE — 63600175 PHARM REV CODE 636 W HCPCS: Performed by: NURSE ANESTHETIST, CERTIFIED REGISTERED

## 2024-04-17 PROCEDURE — 71000039 HC RECOVERY, EACH ADD'L HOUR: Performed by: OBSTETRICS & GYNECOLOGY

## 2024-04-17 PROCEDURE — 59514 CESAREAN DELIVERY ONLY: CPT | Mod: QY,ANES,, | Performed by: ANESTHESIOLOGY

## 2024-04-17 PROCEDURE — 36004724 HC OB OR TIME LEV III - 1ST 15 MIN: Performed by: OBSTETRICS & GYNECOLOGY

## 2024-04-17 PROCEDURE — 85025 COMPLETE CBC W/AUTO DIFF WBC: CPT | Performed by: OBSTETRICS & GYNECOLOGY

## 2024-04-17 PROCEDURE — 12000002 HC ACUTE/MED SURGE SEMI-PRIVATE ROOM

## 2024-04-17 PROCEDURE — 37000009 HC ANESTHESIA EA ADD 15 MINS: Mod: SZN

## 2024-04-17 PROCEDURE — 36004725 HC OB OR TIME LEV III - EA ADD 15 MIN: Performed by: OBSTETRICS & GYNECOLOGY

## 2024-04-17 PROCEDURE — 63600175 PHARM REV CODE 636 W HCPCS: Performed by: ANESTHESIOLOGY

## 2024-04-17 PROCEDURE — 36415 COLL VENOUS BLD VENIPUNCTURE: CPT | Performed by: OBSTETRICS & GYNECOLOGY

## 2024-04-17 PROCEDURE — 37000008 HC ANESTHESIA 1ST 15 MINUTES: Performed by: OBSTETRICS & GYNECOLOGY

## 2024-04-17 PROCEDURE — 0UB70ZZ EXCISION OF BILATERAL FALLOPIAN TUBES, OPEN APPROACH: ICD-10-PCS | Performed by: OBSTETRICS & GYNECOLOGY

## 2024-04-17 PROCEDURE — 25000003 PHARM REV CODE 250: Performed by: OBSTETRICS & GYNECOLOGY

## 2024-04-17 PROCEDURE — 71000033 HC RECOVERY, INTIAL HOUR: Performed by: OBSTETRICS & GYNECOLOGY

## 2024-04-17 PROCEDURE — C9290 INJ, BUPIVACAINE LIPOSOME: HCPCS | Performed by: OBSTETRICS & GYNECOLOGY

## 2024-04-17 RX ORDER — ONDANSETRON HYDROCHLORIDE 2 MG/ML
INJECTION, SOLUTION INTRAVENOUS
Status: DISCONTINUED | OUTPATIENT
Start: 2024-04-17 | End: 2024-04-17

## 2024-04-17 RX ORDER — ONDANSETRON HYDROCHLORIDE 2 MG/ML
4 INJECTION, SOLUTION INTRAVENOUS EVERY 6 HOURS PRN
Status: DISCONTINUED | OUTPATIENT
Start: 2024-04-17 | End: 2024-04-20 | Stop reason: HOSPADM

## 2024-04-17 RX ORDER — DIPHENHYDRAMINE HYDROCHLORIDE 50 MG/ML
12.5 INJECTION INTRAMUSCULAR; INTRAVENOUS EVERY 4 HOURS PRN
Status: DISCONTINUED | OUTPATIENT
Start: 2024-04-17 | End: 2024-04-20 | Stop reason: HOSPADM

## 2024-04-17 RX ORDER — OXYCODONE AND ACETAMINOPHEN 10; 325 MG/1; MG/1
1 TABLET ORAL EVERY 6 HOURS PRN
Status: DISCONTINUED | OUTPATIENT
Start: 2024-04-17 | End: 2024-04-19

## 2024-04-17 RX ORDER — MISOPROSTOL 200 UG/1
800 TABLET ORAL ONCE AS NEEDED
Status: DISCONTINUED | OUTPATIENT
Start: 2024-04-17 | End: 2024-04-20 | Stop reason: HOSPADM

## 2024-04-17 RX ORDER — HYDROMORPHONE HYDROCHLORIDE 1 MG/ML
1 INJECTION, SOLUTION INTRAMUSCULAR; INTRAVENOUS; SUBCUTANEOUS ONCE
Status: COMPLETED | OUTPATIENT
Start: 2024-04-17 | End: 2024-04-17

## 2024-04-17 RX ORDER — TRANEXAMIC ACID 10 MG/ML
1000 INJECTION, SOLUTION INTRAVENOUS ONCE AS NEEDED
Status: DISCONTINUED | OUTPATIENT
Start: 2024-04-17 | End: 2024-04-20 | Stop reason: HOSPADM

## 2024-04-17 RX ORDER — SODIUM CHLORIDE, SODIUM LACTATE, POTASSIUM CHLORIDE, CALCIUM CHLORIDE 600; 310; 30; 20 MG/100ML; MG/100ML; MG/100ML; MG/100ML
INJECTION, SOLUTION INTRAVENOUS CONTINUOUS PRN
Status: DISCONTINUED | OUTPATIENT
Start: 2024-04-17 | End: 2024-04-17

## 2024-04-17 RX ORDER — OXYTOCIN/RINGER'S LACTATE 30/500 ML
334 PLASTIC BAG, INJECTION (ML) INTRAVENOUS ONCE AS NEEDED
Status: DISCONTINUED | OUTPATIENT
Start: 2024-04-17 | End: 2024-04-20 | Stop reason: HOSPADM

## 2024-04-17 RX ORDER — OXYTOCIN/RINGER'S LACTATE 30/500 ML
95 PLASTIC BAG, INJECTION (ML) INTRAVENOUS ONCE
Status: DISCONTINUED | OUTPATIENT
Start: 2024-04-17 | End: 2024-04-20 | Stop reason: HOSPADM

## 2024-04-17 RX ORDER — AMOXICILLIN 250 MG
1 CAPSULE ORAL NIGHTLY PRN
Status: DISCONTINUED | OUTPATIENT
Start: 2024-04-17 | End: 2024-04-20 | Stop reason: HOSPADM

## 2024-04-17 RX ORDER — METHYLERGONOVINE MALEATE 0.2 MG/ML
200 INJECTION INTRAVENOUS
Status: DISCONTINUED | OUTPATIENT
Start: 2024-04-17 | End: 2024-04-20 | Stop reason: HOSPADM

## 2024-04-17 RX ORDER — OXYCODONE AND ACETAMINOPHEN 5; 325 MG/1; MG/1
1 TABLET ORAL EVERY 6 HOURS PRN
Status: DISCONTINUED | OUTPATIENT
Start: 2024-04-17 | End: 2024-04-19

## 2024-04-17 RX ORDER — ACETAMINOPHEN 10 MG/ML
1000 INJECTION, SOLUTION INTRAVENOUS
Status: DISPENSED | OUTPATIENT
Start: 2024-04-17 | End: 2024-04-18

## 2024-04-17 RX ORDER — OXYTOCIN 10 [USP'U]/ML
10 INJECTION, SOLUTION INTRAMUSCULAR; INTRAVENOUS ONCE AS NEEDED
Status: DISCONTINUED | OUTPATIENT
Start: 2024-04-17 | End: 2024-04-20 | Stop reason: HOSPADM

## 2024-04-17 RX ORDER — DOCUSATE SODIUM 100 MG/1
200 CAPSULE, LIQUID FILLED ORAL 2 TIMES DAILY
Status: DISCONTINUED | OUTPATIENT
Start: 2024-04-17 | End: 2024-04-20 | Stop reason: HOSPADM

## 2024-04-17 RX ORDER — OXYTOCIN-SODIUM CHLORIDE 0.9% IV SOLN 30 UNIT/500ML 30-0.9/5 UT/ML-%
SOLUTION INTRAVENOUS
Status: DISCONTINUED | OUTPATIENT
Start: 2024-04-17 | End: 2024-04-17

## 2024-04-17 RX ORDER — PHENYLEPHRINE HYDROCHLORIDE 10 MG/ML
INJECTION INTRAVENOUS
Status: DISCONTINUED | OUTPATIENT
Start: 2024-04-17 | End: 2024-04-17

## 2024-04-17 RX ORDER — ONDANSETRON 4 MG/1
8 TABLET, ORALLY DISINTEGRATING ORAL EVERY 8 HOURS PRN
Status: DISCONTINUED | OUTPATIENT
Start: 2024-04-17 | End: 2024-04-20 | Stop reason: HOSPADM

## 2024-04-17 RX ORDER — BUPIVACAINE HYDROCHLORIDE 5 MG/ML
24 INJECTION, SOLUTION EPIDURAL; INTRACAUDAL
Status: DISCONTINUED | OUTPATIENT
Start: 2024-04-17 | End: 2024-04-20 | Stop reason: HOSPADM

## 2024-04-17 RX ORDER — SIMETHICONE 80 MG
1 TABLET,CHEWABLE ORAL EVERY 6 HOURS PRN
Status: DISCONTINUED | OUTPATIENT
Start: 2024-04-17 | End: 2024-04-20 | Stop reason: HOSPADM

## 2024-04-17 RX ORDER — SODIUM CHLORIDE, SODIUM LACTATE, POTASSIUM CHLORIDE, CALCIUM CHLORIDE 600; 310; 30; 20 MG/100ML; MG/100ML; MG/100ML; MG/100ML
INJECTION, SOLUTION INTRAVENOUS CONTINUOUS
Status: DISCONTINUED | OUTPATIENT
Start: 2024-04-17 | End: 2024-04-20 | Stop reason: HOSPADM

## 2024-04-17 RX ORDER — NALOXONE HCL 0.4 MG/ML
0.02 VIAL (ML) INJECTION ONCE AS NEEDED
Status: DISCONTINUED | OUTPATIENT
Start: 2024-04-17 | End: 2024-04-20 | Stop reason: HOSPADM

## 2024-04-17 RX ORDER — CARBOPROST TROMETHAMINE 250 UG/ML
250 INJECTION, SOLUTION INTRAMUSCULAR
Status: DISCONTINUED | OUTPATIENT
Start: 2024-04-17 | End: 2024-04-20 | Stop reason: HOSPADM

## 2024-04-17 RX ORDER — CEFAZOLIN SODIUM 2 G/50ML
2 SOLUTION INTRAVENOUS
Status: COMPLETED | OUTPATIENT
Start: 2024-04-17 | End: 2024-04-17

## 2024-04-17 RX ORDER — IBUPROFEN 400 MG/1
800 TABLET ORAL EVERY 6 HOURS
Status: DISCONTINUED | OUTPATIENT
Start: 2024-04-18 | End: 2024-04-18

## 2024-04-17 RX ORDER — MIDAZOLAM HYDROCHLORIDE 1 MG/ML
INJECTION INTRAMUSCULAR; INTRAVENOUS
Status: DISCONTINUED | OUTPATIENT
Start: 2024-04-17 | End: 2024-04-17

## 2024-04-17 RX ORDER — HYDROMORPHONE HCL IN 0.9% NACL 6 MG/30 ML
PATIENT CONTROLLED ANALGESIA SYRINGE INTRAVENOUS CONTINUOUS
Status: DISCONTINUED | OUTPATIENT
Start: 2024-04-17 | End: 2024-04-20 | Stop reason: HOSPADM

## 2024-04-17 RX ADMIN — Medication 30 UNITS: at 08:04

## 2024-04-17 RX ADMIN — PHENYLEPHRINE HYDROCHLORIDE 200 MCG: 10 INJECTION INTRAVENOUS at 07:04

## 2024-04-17 RX ADMIN — Medication 30 UNITS: at 07:04

## 2024-04-17 RX ADMIN — Medication: at 04:04

## 2024-04-17 RX ADMIN — ACETAMINOPHEN 1000 MG: 10 INJECTION, SOLUTION INTRAVENOUS at 07:04

## 2024-04-17 RX ADMIN — ONDANSETRON 4 MG: 2 INJECTION INTRAMUSCULAR; INTRAVENOUS at 07:04

## 2024-04-17 RX ADMIN — MIDAZOLAM HYDROCHLORIDE 2 MG: 1 INJECTION, SOLUTION INTRAMUSCULAR; INTRAVENOUS at 07:04

## 2024-04-17 RX ADMIN — DOCUSATE SODIUM 200 MG: 100 CAPSULE, LIQUID FILLED ORAL at 09:04

## 2024-04-17 RX ADMIN — PHENYLEPHRINE HYDROCHLORIDE 100 MCG: 10 INJECTION INTRAVENOUS at 07:04

## 2024-04-17 RX ADMIN — SODIUM CHLORIDE, SODIUM LACTATE, POTASSIUM CHLORIDE, AND CALCIUM CHLORIDE: .6; .31; .03; .02 INJECTION, SOLUTION INTRAVENOUS at 06:04

## 2024-04-17 RX ADMIN — SODIUM CHLORIDE, SODIUM LACTATE, POTASSIUM CHLORIDE, AND CALCIUM CHLORIDE: .6; .31; .03; .02 INJECTION, SOLUTION INTRAVENOUS at 07:04

## 2024-04-17 RX ADMIN — Medication: at 08:04

## 2024-04-17 RX ADMIN — CEFAZOLIN SODIUM 2 G: 2 SOLUTION INTRAVENOUS at 07:04

## 2024-04-17 RX ADMIN — HYDROMORPHONE HYDROCHLORIDE 1 MG: 1 INJECTION, SOLUTION INTRAMUSCULAR; INTRAVENOUS; SUBCUTANEOUS at 09:04

## 2024-04-17 RX ADMIN — OXYCODONE HYDROCHLORIDE AND ACETAMINOPHEN 1 TABLET: 10; 325 TABLET ORAL at 11:04

## 2024-04-17 NOTE — NURSING
Nurses Note -- 4 Eyes      4/17/2024   5:58 AM      Skin assessed during: Admit      [x] No Altered Skin Integrity Present    [x]Prevention Measures Documented      [] Yes- Altered Skin Integrity Present or Discovered   [] LDA Added if Not in Epic (Describe Wound)   [] New Altered Skin Integrity was Present on Admit and Documented in LDA   [] Wound Image Taken    Wound Care Consulted? No    Attending Nurse: Robi Shankar RN/Staff Member:    Chad MACKAY
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
## 2024-04-17 NOTE — LACTATION NOTE
This note was copied from a baby's chart.  Mom reports that she prefers to just pump her breast than put baby to the breast. She exclusively pumped for her 1st baby for 3 months while baby was in the NICU. Mom has her own personal breast pump & family will be bringing it to her. Discussed pumping frequency 8 times in 24 hours or every 3 hours. Also dicussed cleaning & sanitizing pump parts & breast milk storage & collection. Assistance offered prn. Mom verbalized understanding

## 2024-04-17 NOTE — L&D DELIVERY NOTE
[facility]   Section   Operative Note    SUMMARY     Date of Procedure: 2024     Procedure: Procedure(s) (LRB):   SECTION, WITH TUBAL LIGATION (N/A)    Surgeons and Role:     * Kalli Dias MD - Primary    Assisting Surgeon: Chelle Hook MD    Pre-Operative Diagnosis: Delivery by elective  section [O82] desires sterilization    Post-Operative Diagnosis: Post-Op Diagnosis Codes:     * Delivery by elective  section [O82] sterilzation    Anesthesia: Spinal/Epidural    Technical Procedures Used: LTCS and [place] BTL           Description of the Findings of the Procedure: normal female anatomy    Significant Surgical Tasks Conducted by the Assistant(s), if Applicable: none    Complications: No    Blood Loss: *350cc    The risks, benefits, indication, and alternatives of the procedure were discussed with the patient and informed consent was obtained.  She was taken to the operating room and underwent a spinal for her anesthesia.  A Valderrama catheter was inserted. SCD hose were placed and she received Ancef 2gm IV as antibiotic prophylaxis before any incision was made.   FHT's were monitored until she had an appropriate surgical level.  She was prepped with chloraprep and draped in the usual sterile fashion.  A time out was taken before the start of the procedure.     A Pfannenstiel skin incision was made with the knife and this was carried down to the fascia. The fascial incision was extended transversely, and then both superiorly and inferiorly off of the muscle.  The muscle was  in the midline, and the peritoneum was identified and entered bluntly. The peritoneal incision was extended superiorly and inferiorly with good visualization of bladder. The bladder blade was inserted and the bladder flap was created under both sharp and blunt dissection.      The lower uterine segment was started with the knife, and this incision was extended laterally and bluntly  also for a low transverse uterine incision. The membranes were ruptured upon entry and is clear. The infant is in the vertex presentation.  The bladder blade was removed and the infant's head was delivered after about 60sec of pushing on the fundus, then the shoulders cleared easily, followed by the rest of the body without difficulty.  It is a viable male infant.  The cord was clamped and cut, and the infant was handed off to the  nurse. Weight 7# 11oz. The placenta was delivered and the uterus was exteriorized and wiped clean. The uterine incision was repaired with a single layer interlocking of 0 Maxon.    Both fallopian tubes were identified and followed out to the fimbriated portion for identification.  The proximal segment of the each tube was tied off with 2.0 silk, then a bubble portion of each tube was tied with 0 plain gut x 2 and the intervening segment was cut and sent to pathology.  Each tube was hemostatic.    The posterior cul-de-sac was irrigated, then the uterus was returned into the abdomen, and the pelvis was irrigated copiously with warm normal saline. The uterine incision, the lower uterine segment and each tube were again checked for hemostasis, and Rosa was placed over the area for added hemostasis.  All instruments and laps were removed from the abdomen and pelvis and counts are correct.     The peritoneum was closed with a 2-0 Vicryl in a running fashion. The rectus muscles are hemostatic, and the pyramidalis muscle was closed with a U stitch of 2-0 Maxon.  The fascia was closed with a 0 Maxon in a running fashion. The subcuticular fat was irrigated with normal saline and hemostasis was achieved using the Bovie. Carol's fascia was reapproximated using 2-0 Vicryl in a running fashion. Exparel was injected across the surgical incision.  The skin was closed with 4-0 Monocryl in a subcuticular fashion and Dermaflex was placed over the incision followed by a Mepilex dressing that was  "placed over the incision. The patient tolerated procedure well. Sponge, lap, needle and instrument counts were correct ×2. She was taken recovery in stable condition. The gonzalez continues to drain clear urine.           Specimens:   Specimen (24h ago, onward)      None            Condition: Good    Disposition: PACU - hemodynamically stable.    Attestation: Good         Delivery Information for Jim Moore    Birth information:  YOB: 2026   Time of birth: 7:37 AM   Sex: male   Head Delivery Date/Time:     Delivery type:    Gestational Age: 39w2d        Delivery Providers    Delivering clinician:            Measurements    Weight: 3485 g  Weight (lbs): 7 lb 10.9 oz  Length: 51.4 cm  Length (in): 20.25"  Head circumference: 36.5 cm  Chest circumference: 32.5 cm  Abdominal girth: 32.5 cm         Apgars    Living status:   Apgar Component Scores:  1 min.:  5 min.:  10 min.:  15 min.:  20 min.:    Skin color:         Heart rate:         Reflex irritability:         Muscle tone:         Respiratory effort:         Total:                                  Interventions/Resuscitation           Cord    No data filed       Placenta    Placenta delivery date/time:   Placenta removal:            Labor Events:       labor:       Labor Onset Date/Time:         Dilation Complete Date/Time:         Start Pushing Date/Time:         Start Pushing Date/Time:       Rupture Date/Time:            Rupture type:          Fluid Amount:       Fluid Color:                steroids:       Antibiotics given for GBS:       Induction:       Indications for induction:        Augmentation:       Indications for augmentation:       Labor complications:       Additional complications:          Cervical ripening:                     Delivery:      Episiotomy:       Indication for Episiotomy:       Perineal Lacerations:   Repaired:      Periurethral Laceration:   Repaired:     Labial Laceration:   Repaired:   "   Sulcus Laceration:   Repaired:     Vaginal Laceration:   Repaired:     Cervical Laceration:   Repaired:     Repair suture:       Repair # of packets:       Last Value - EBL - Nursing (mL):       Sum - EBL - Nursing (mL): 0     Last Value - EBL - Anesthesia (mL):      Calculated QBL (mL):       Running total QBL (mL):       Vaginal Sweep Performed:       Surgicount Correct:         Other providers:            Details (if applicable):  Trial of Labor      Categorization:      Priority:     Indications for :     Incision Type:       Additional  information:  Forceps:    Vacuum:    Breech:    Observed anomalies    Other (Comments):

## 2024-04-17 NOTE — NURSING TRANSFER
Nursing Transfer Note      4/17/2024   10:57 AM    Nurse giving handoff:Tran Aviles RN  Nurse receiving handoff:Cary    Reason patient is being transferred: delivered    Transfer To: 2109    Transfer via bed    Transfer with belongings    Transported by bed    Transfer Vital Signs:  Blood Pressure:145/78  Heart Rate:84  O2:100  Temperature:97.7  Respirations:17      Order for Tele Monitor? No    Additional Lines: Valderrama Catheter    4eyes on Skin: yes    Medicines sent: PCA pump    Any special needs or follow-up needed: none    Patient belongings transferred with patient: Yes    Chart send with patient: Yes    Notified: Family present on transfer    Patient reassessed at: 1026 4/17/24 (date, time)  1  Upon arrival to floor: patient oriented to room, call bell in reach, and bed in lowest position

## 2024-04-17 NOTE — ANESTHESIA PREPROCEDURE EVALUATION
04/17/2024  Stephanie Moore is a 28 y.o., female.      There is no problem list on file for this patient.      No past surgical history on file.     Tobacco Use:  The patient  reports that she has been smoking cigarettes. She does not have any smokeless tobacco history on file.     No results found for this or any previous visit.          Lab Results   Component Value Date    WBC 11.54 04/16/2024    HGB 10.8 (L) 04/16/2024    HCT 32.9 (L) 04/16/2024    MCV 87 04/16/2024     04/16/2024     BMP  Lab Results   Component Value Date     (L) 04/16/2024    K 3.8 04/16/2024     04/16/2024    CO2 18 (L) 04/16/2024    BUN 8 04/16/2024    CREATININE 0.5 04/16/2024    CALCIUM 8.7 04/16/2024    ANIONGAP 11 04/16/2024     (H) 04/16/2024     (H) 01/04/2024       No results found for this or any previous visit.              Pre-op Assessment    I have reviewed the Patient Summary Reports.     I have reviewed the Nursing Notes. I have reviewed the NPO Status.   I have reviewed the Medications.     Review of Systems  Anesthesia Hx:  No problems with previous Anesthesia             Denies Family Hx of Anesthesia complications.    Denies Personal Hx of Anesthesia complications.                    Social:  Smoker       Hematology/Oncology:  Hematology Normal                                     Cardiovascular:  Cardiovascular Normal                                            Pulmonary:  Pulmonary Normal                       Hepatic/GI:  Hepatic/GI Normal                 Musculoskeletal:  Musculoskeletal Normal                Neurological:  Neurology Normal                                      Endocrine:  Diabetes, gestational         Obesity / BMI > 30      Physical Exam  General: Well nourished, Cooperative, Alert and Oriented    Airway:  Mallampati: III / II  Mouth Opening: Normal  TM  Distance: Normal  Tongue: Normal  Neck ROM: Normal ROM    Dental:  Intact    Chest/Lungs:  Clear to auscultation    Heart:  Rate: Normal  Rhythm: Regular Rhythm  Sounds: Normal    Abdomen:  Normal, Soft, Nontender        Anesthesia Plan  Type of Anesthesia, risks & benefits discussed:    Anesthesia Type: CSE  Intra-op Monitoring Plan: Standard ASA Monitors  Post Op Pain Control Plan: epidural analgesia and multimodal analgesia  Informed Consent: Informed consent signed with the Patient and all parties understand the risks and agree with anesthesia plan.  All questions answered.   ASA Score: 2 Emergent  Anesthesia Plan Notes:   CSE  IV tylenol  NO Duramorph  Zofran Pepcid    Ready For Surgery From Anesthesia Perspective.     .

## 2024-04-17 NOTE — TRANSFER OF CARE
"Anesthesia Transfer of Care Note    Patient: Stephanie Moore    Procedure(s) Performed: Procedure(s) (LRB):   SECTION, WITH TUBAL LIGATION (N/A)    Patient location: Labor and Delivery    Anesthesia Type: CSE    Transport from OR: Transported from OR on room air with adequate spontaneous ventilation    Post pain: adequate analgesia    Post assessment: no apparent anesthetic complications and tolerated procedure well    Post vital signs: stable    Level of consciousness: awake, alert and oriented    Nausea/Vomiting: no nausea/vomiting    Complications: none    Transfer of care protocol was followed      Last vitals: Visit Vitals  BP (!) 143/82   Pulse 93   Temp 36.7 °C (98 °F)   Resp 18   Ht 5' 3" (1.6 m)   Wt 94.3 kg (208 lb)   LMP  (LMP Unknown)   SpO2 (!) 94%   Breastfeeding No   BMI 36.85 kg/m²     "

## 2024-04-17 NOTE — NURSING
0910- Pt complained of pain 10/10, notified anesthesia, new order received for Dilaudid 1mg IVP now.

## 2024-04-17 NOTE — ANESTHESIA PROCEDURE NOTES
CSE    Patient location during procedure: OR  Start time: 4/17/2024 7:10 AM  Timeout: 4/17/2024 7:10 AM  End time: 4/17/2024 7:20 AM      Staffing  Authorizing Provider: Brent Becerril MD  Performing Provider: Brent Becerril MD    Staffing  Performed by: Brent Becerril MD  Authorized by: Brent Becerril MD    Preanesthetic Checklist  Completed: patient identified, IV checked, risks and benefits discussed, surgical consent, monitors and equipment checked, pre-op evaluation and timeout performed  CSE  Patient position: sitting  Prep: Betadine  Patient monitoring: heart rate, cardiac monitor, continuous pulse ox and frequent blood pressure checks  Approach: midline  Spinal Needle  Needle type: pencil-tip   Needle gauge: 25 G  Needle length: 5 in  Epidural Needle  Injection technique: NIKI air  Needle type: Tuohy   Needle gauge: 17 G  Needle length: 3.5 in  Location: L4-5  Needle localization: anatomical landmarks   Catheter  Catheter type: springwound  Catheter size: 19 G  Test dose: lidocaine 1.5% with Epi 1-to-200,000  Test dose: 3 mL  Additional Documentation: incremental injection, negative aspiration for CSF and negative aspiration for heme  Assessment  Sensory level: T5   Dermatomal levels determined by pinch or prick  Intrathecal Medications:   administered: primary anesthetic mcg of    Additional Notes  After CSF was obtained bupivacaine 0.75% hyperbaric x 1.5 mL. was injected.

## 2024-04-18 PROCEDURE — 12000002 HC ACUTE/MED SURGE SEMI-PRIVATE ROOM

## 2024-04-18 PROCEDURE — 25000003 PHARM REV CODE 250: Performed by: OBSTETRICS & GYNECOLOGY

## 2024-04-18 RX ORDER — IBUPROFEN 400 MG/1
800 TABLET ORAL EVERY 6 HOURS
Status: DISCONTINUED | OUTPATIENT
Start: 2024-04-18 | End: 2024-04-20 | Stop reason: HOSPADM

## 2024-04-18 RX ADMIN — IBUPROFEN 800 MG: 400 TABLET ORAL at 07:04

## 2024-04-18 RX ADMIN — OXYCODONE HYDROCHLORIDE AND ACETAMINOPHEN 1 TABLET: 10; 325 TABLET ORAL at 12:04

## 2024-04-18 RX ADMIN — Medication: at 12:04

## 2024-04-18 RX ADMIN — DOCUSATE SODIUM 200 MG: 100 CAPSULE, LIQUID FILLED ORAL at 08:04

## 2024-04-18 RX ADMIN — IBUPROFEN 800 MG: 400 TABLET ORAL at 02:04

## 2024-04-18 RX ADMIN — OXYCODONE HYDROCHLORIDE AND ACETAMINOPHEN 1 TABLET: 10; 325 TABLET ORAL at 06:04

## 2024-04-18 RX ADMIN — OXYCODONE HYDROCHLORIDE AND ACETAMINOPHEN 1 TABLET: 10; 325 TABLET ORAL at 04:04

## 2024-04-18 RX ADMIN — IBUPROFEN 800 MG: 400 TABLET ORAL at 11:04

## 2024-04-18 RX ADMIN — OXYCODONE HYDROCHLORIDE AND ACETAMINOPHEN 1 TABLET: 10; 325 TABLET ORAL at 08:04
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
## 2024-04-18 NOTE — ANESTHESIA POSTPROCEDURE EVALUATION
Anesthesia Post Evaluation    Patient: Stephanie Moore    Procedure(s) Performed: Procedure(s) (LRB):   SECTION, WITH TUBAL LIGATION (N/A)    Final Anesthesia Type: CSE      Patient location during evaluation: floor  Patient participation: Yes- Able to Participate  Level of consciousness: awake and alert and oriented  Post-procedure vital signs: reviewed and stable  Pain management: adequate  Airway patency: patent    PONV status at discharge: No PONV  Anesthetic complications: no      Cardiovascular status: blood pressure returned to baseline and hemodynamically stable  Respiratory status: unassisted, spontaneous ventilation and room air  Hydration status: euvolemic  Follow-up not needed.          Postop day 1. Status post  under combined spinal epidural anesthesia.  patient placed on Dilaudid PCA due to morphine intolerance.  This morning patient is resting comfortably in bed.  She is alert and oriented without complaints.  Patient denies headache, back pain, leg pain weakness or numbness.  Epidural site examined and no bleeding bruising or discharge noted.  Patient reports overall good pain control since surgery with use of PCA overnight, the PCA was DC this morning and patient is now tolerating oral pain medications well. She denies any significant nausea vomiting or pruritus.    No apparent anesthetic related complications.    Please re-consult if needed.      Vitals Value Taken Time   /91 24 0730   Temp 36.8 °C (98.3 °F) 24 0730   Pulse 110 24 0730   Resp 18 24 0847   SpO2 97 % 24 0730         Event Time   Out of Recovery 10:27:00         Pain/Sacha Score: Pain Rating Prior to Med Admin: 4 (2024  8:47 AM)  Pain Rating Post Med Admin: 2 (2024  7:48 AM)  Sacha Score: 10 (2024 10:30 AM)

## 2024-04-18 NOTE — PROGRESS NOTES
[facility]  Obstetrics  Postpartum Progress Note    Patient Name: Stephanie Moore  MRN: [id number]  Admission Date: 2024  Hospital Length of Stay: 1 days  Attending Physician: Kalli Dias MD  Primary Care Provider: Syeda, Primary Doctor    Subjective:     Principal Problem:Delivery by elective  section    Hospital Course:  28yo, 39.2 wks, previous birth trauma, desires primary CS and sterilization. Undergoes LTCS and BTL.    Interval History: POD #1    Valderrama out, regular diet, breast feeding.  Abd-decreased bowel sounds, no distention, Mepilex in place  Extr-negative Moni's    Objective:     Vital Signs (Most Recent):  Temp: 98.3 °F (36.8 °C) (24)  Pulse: 110 (24)  Resp: 17 (24)  BP: (!) 143/91 (24)  SpO2: 97 % (24) Vital Signs (24h Range):  Temp:  [98.1 °F (36.7 °C)-98.8 °F (37.1 °C)] 98.3 °F (36.8 °C)  Pulse:  [] 110  Resp:  [17-20] 17  SpO2:  [96 %-100 %] 97 %  BP: (120-206)/(65-98) 143/91     Weight: 94.3 kg (208 lb)  Body mass index is 36.85 kg/m².      Intake/Output Summary (Last 24 hours) at 2024 0839  Last data filed at 2024 0725  Gross per 24 hour   Intake --   Output 4205 ml   Net -4205 ml         Significant Labs:  Lab Results   Component Value Date    GROUPTRH B POS 2024    STREPBCULT negative 2024     Recent Labs   Lab 24  1157   HGB 10.5*   HCT 32.4*       I have personallly reviewed all pertinent lab results from the last 24 hours.    Physical Exam    Review of Systems  Assessment/Plan:     28 y.o. female  for:    * Delivery by elective  section  POD #1 primary CS and BTL  Breast feeding  Routine care        Disposition: As patient meets milestones, will plan to discharge when ready.    Kalli Dias MD  Obstetrics  [facility]

## 2024-04-18 NOTE — SUBJECTIVE & OBJECTIVE
Interval History: POD #1    Valderrama out, regular diet, breast feeding.  Abd-decreased bowel sounds, no distention, Mepilex in place  Extr-negative Moni's    Objective:     Vital Signs (Most Recent):  Temp: 98.3 °F (36.8 °C) (04/18/24 0730)  Pulse: 110 (04/18/24 0730)  Resp: 17 (04/18/24 0730)  BP: (!) 143/91 (04/18/24 0730)  SpO2: 97 % (04/18/24 0730) Vital Signs (24h Range):  Temp:  [98.1 °F (36.7 °C)-98.8 °F (37.1 °C)] 98.3 °F (36.8 °C)  Pulse:  [] 110  Resp:  [17-20] 17  SpO2:  [96 %-100 %] 97 %  BP: (120-206)/(65-98) 143/91     Weight: 94.3 kg (208 lb)  Body mass index is 36.85 kg/m².      Intake/Output Summary (Last 24 hours) at 4/18/2024 0839  Last data filed at 4/18/2024 0725  Gross per 24 hour   Intake --   Output 4205 ml   Net -4205 ml         Significant Labs:  Lab Results   Component Value Date    GROUPTRH B POS 04/16/2024    STREPBCULT negative 03/21/2024     Recent Labs   Lab 04/17/24  1157   HGB 10.5*   HCT 32.4*       I have personallly reviewed all pertinent lab results from the last 24 hours.    Physical Exam    Review of Systems

## 2024-04-19 PROCEDURE — 90715 TDAP VACCINE 7 YRS/> IM: CPT | Performed by: OBSTETRICS & GYNECOLOGY

## 2024-04-19 PROCEDURE — 25000003 PHARM REV CODE 250: Performed by: OBSTETRICS & GYNECOLOGY

## 2024-04-19 PROCEDURE — 12000002 HC ACUTE/MED SURGE SEMI-PRIVATE ROOM

## 2024-04-19 PROCEDURE — 90471 IMMUNIZATION ADMIN: CPT | Performed by: OBSTETRICS & GYNECOLOGY

## 2024-04-19 PROCEDURE — 3E0234Z INTRODUCTION OF SERUM, TOXOID AND VACCINE INTO MUSCLE, PERCUTANEOUS APPROACH: ICD-10-PCS | Performed by: OBSTETRICS & GYNECOLOGY

## 2024-04-19 PROCEDURE — 63600175 PHARM REV CODE 636 W HCPCS: Performed by: OBSTETRICS & GYNECOLOGY

## 2024-04-19 RX ORDER — METOCLOPRAMIDE 10 MG/1
10 TABLET ORAL 4 TIMES DAILY
Qty: 28 TABLET | Refills: 1 | Status: SHIPPED | OUTPATIENT
Start: 2024-04-19

## 2024-04-19 RX ORDER — OXYCODONE AND ACETAMINOPHEN 5; 325 MG/1; MG/1
1 TABLET ORAL EVERY 6 HOURS PRN
Qty: 20 TABLET | Refills: 0 | Status: SHIPPED | OUTPATIENT
Start: 2024-04-19

## 2024-04-19 RX ORDER — OXYCODONE AND ACETAMINOPHEN 10; 325 MG/1; MG/1
1 TABLET ORAL EVERY 4 HOURS PRN
Status: DISCONTINUED | OUTPATIENT
Start: 2024-04-19 | End: 2024-04-20 | Stop reason: HOSPADM

## 2024-04-19 RX ORDER — METOCLOPRAMIDE 10 MG/1
10 TABLET ORAL 4 TIMES DAILY
Status: DISCONTINUED | OUTPATIENT
Start: 2024-04-19 | End: 2024-04-20 | Stop reason: HOSPADM

## 2024-04-19 RX ORDER — IBUPROFEN 800 MG/1
800 TABLET ORAL EVERY 6 HOURS PRN
Qty: 20 TABLET | Refills: 2 | Status: SHIPPED | OUTPATIENT
Start: 2024-04-19

## 2024-04-19 RX ORDER — OXYCODONE AND ACETAMINOPHEN 5; 325 MG/1; MG/1
1 TABLET ORAL EVERY 4 HOURS PRN
Status: DISCONTINUED | OUTPATIENT
Start: 2024-04-19 | End: 2024-04-20 | Stop reason: HOSPADM

## 2024-04-19 RX ADMIN — METOCLOPRAMIDE 10 MG: 10 TABLET ORAL at 08:04

## 2024-04-19 RX ADMIN — METOCLOPRAMIDE 10 MG: 10 TABLET ORAL at 12:04

## 2024-04-19 RX ADMIN — IBUPROFEN 800 MG: 400 TABLET ORAL at 12:04

## 2024-04-19 RX ADMIN — CLOSTRIDIUM TETANI TOXOID ANTIGEN (FORMALDEHYDE INACTIVATED), CORYNEBACTERIUM DIPHTHERIAE TOXOID ANTIGEN (FORMALDEHYDE INACTIVATED), BORDETELLA PERTUSSIS TOXOID ANTIGEN (GLUTARALDEHYDE INACTIVATED), BORDETELLA PERTUSSIS FILAMENTOUS HEMAGGLUTININ ANTIGEN (FORMALDEHYDE INACTIVATED), BORDETELLA PERTUSSIS PERTACTIN ANTIGEN, AND BORDETELLA PERTUSSIS FIMBRIAE 2/3 ANTIGEN 0.5 ML: 5; 2; 2.5; 5; 3; 5 INJECTION, SUSPENSION INTRAMUSCULAR at 08:04

## 2024-04-19 RX ADMIN — OXYCODONE HYDROCHLORIDE AND ACETAMINOPHEN 1 TABLET: 10; 325 TABLET ORAL at 01:04

## 2024-04-19 RX ADMIN — DOCUSATE SODIUM 200 MG: 100 CAPSULE, LIQUID FILLED ORAL at 08:04

## 2024-04-19 RX ADMIN — IBUPROFEN 800 MG: 400 TABLET ORAL at 11:04

## 2024-04-19 RX ADMIN — IBUPROFEN 800 MG: 400 TABLET ORAL at 06:04

## 2024-04-19 RX ADMIN — SIMETHICONE 80 MG: 80 TABLET, CHEWABLE ORAL at 08:04

## 2024-04-19 RX ADMIN — SIMETHICONE 80 MG: 80 TABLET, CHEWABLE ORAL at 01:04

## 2024-04-19 RX ADMIN — OXYCODONE HYDROCHLORIDE AND ACETAMINOPHEN 1 TABLET: 10; 325 TABLET ORAL at 02:04

## 2024-04-19 RX ADMIN — OXYCODONE HYDROCHLORIDE AND ACETAMINOPHEN 1 TABLET: 10; 325 TABLET ORAL at 11:04

## 2024-04-19 RX ADMIN — METOCLOPRAMIDE 10 MG: 10 TABLET ORAL at 05:04

## 2024-04-19 RX ADMIN — OXYCODONE HYDROCHLORIDE AND ACETAMINOPHEN 1 TABLET: 10; 325 TABLET ORAL at 08:04

## 2024-04-19 RX ADMIN — IBUPROFEN 800 MG: 400 TABLET ORAL at 05:04

## 2024-04-19 NOTE — LACTATION NOTE
This note was copied from a baby's chart.  Mom reports that she getting more milk now. Breastmilk bottles given to mom. Reinforce breastmilk storage guidelines & collection. Assistance offered prn. Mom verbalized understanding

## 2024-04-19 NOTE — SUBJECTIVE & OBJECTIVE
Interval History:   POD #2 primary CS and BTL.  Regular diet, no nausea, but no flatus yet    Abd-decreased bowel sounds, but a few heard, no distention.       Objective:     Vital Signs (Most Recent):  Temp:  (pt request not to be awoken for vitals) (04/19/24 0450)  Pulse: 107 (04/18/24 2348)  Resp: 18 (04/19/24 0226)  BP: 138/77 (04/18/24 2348)  SpO2: 97 % (04/18/24 2348) Vital Signs (24h Range):  Temp:  [97.9 °F (36.6 °C)-98.6 °F (37 °C)] 98.2 °F (36.8 °C)  Pulse:  [104-110] 107  Resp:  [17-18] 18  SpO2:  [96 %-98 %] 97 %  BP: (138-146)/(77-93) 138/77     Weight: 94.3 kg (208 lb)  Body mass index is 36.85 kg/m².    No intake or output data in the 24 hours ending 04/19/24 0748      Significant Labs:  Lab Results   Component Value Date    GROUPTRH B POS 04/16/2024    STREPBCULT negative 03/21/2024     Recent Labs   Lab 04/17/24  1157   HGB 10.5*   HCT 32.4*       I have personallly reviewed all pertinent lab results from the last 24 hours.    Physical Exam    Review of Systems

## 2024-04-19 NOTE — PROGRESS NOTES
[facility]  Obstetrics  Postpartum Progress Note    Patient Name: Stephanie Moore  MRN: [id number]  Admission Date: 2024  Hospital Length of Stay: 2 days  Attending Physician: Kalli Dias MD  Primary Care Provider: Syeda, Primary Doctor    Subjective:     Principal Problem:Delivery by elective  section    Hospital Course:  28yo, 39.2 wks, previous birth trauma, desires primary CS and sterilization. Undergoes LTCS and BTL.    Interval History:   POD #2 primary CS and BTL.  Regular diet, no nausea, but no flatus yet    Abd-decreased bowel sounds, but a few heard, no distention.       Objective:     Vital Signs (Most Recent):  Temp:  (pt request not to be awoken for vitals) (24 0450)  Pulse: 107 (24 2348)  Resp: 18 (24 0226)  BP: 138/77 (24 2348)  SpO2: 97 % (24 2348) Vital Signs (24h Range):  Temp:  [97.9 °F (36.6 °C)-98.6 °F (37 °C)] 98.2 °F (36.8 °C)  Pulse:  [104-110] 107  Resp:  [17-18] 18  SpO2:  [96 %-98 %] 97 %  BP: (138-146)/(77-93) 138/77     Weight: 94.3 kg (208 lb)  Body mass index is 36.85 kg/m².    No intake or output data in the 24 hours ending 24 0748      Significant Labs:  Lab Results   Component Value Date    GROUPTRH B POS 2024    STREPBCULT negative 2024     Recent Labs   Lab 24  1157   HGB 10.5*   HCT 32.4*       I have personallly reviewed all pertinent lab results from the last 24 hours.    Physical Exam    Review of Systems  Assessment/Plan:     28 y.o. female  for:    * Delivery by elective  section  POD #2 primary CS and BTL  Breast feeding  Decreased bowel function-add reglan  Routine care        Disposition: As patient meets milestones, will plan to discharge when ready.    Kalli Dias MD  Obstetrics  [facility]

## 2024-04-19 NOTE — NURSING
Informed MD of pt increased blood pressure. MD states BP is not above threshold for treatment at this time, and to make sure pain is controlled. Verbalized understanding. Will continue to keep pain controlled.

## 2024-04-20 ENCOUNTER — PATIENT MESSAGE (OUTPATIENT)
Dept: OBSTETRICS AND GYNECOLOGY | Facility: HOSPITAL | Age: 29
End: 2024-04-20

## 2024-04-20 VITALS
HEIGHT: 63 IN | SYSTOLIC BLOOD PRESSURE: 123 MMHG | HEART RATE: 103 BPM | TEMPERATURE: 98 F | RESPIRATION RATE: 18 BRPM | DIASTOLIC BLOOD PRESSURE: 88 MMHG | BODY MASS INDEX: 36.86 KG/M2 | OXYGEN SATURATION: 97 % | WEIGHT: 208 LBS

## 2024-04-20 PROCEDURE — 90471 IMMUNIZATION ADMIN: CPT | Mod: JG | Performed by: OBSTETRICS & GYNECOLOGY

## 2024-04-20 PROCEDURE — 90710 MMRV VACCINE SC: CPT | Mod: JG | Performed by: OBSTETRICS & GYNECOLOGY

## 2024-04-20 PROCEDURE — 63600175 PHARM REV CODE 636 W HCPCS: Mod: JG | Performed by: OBSTETRICS & GYNECOLOGY

## 2024-04-20 PROCEDURE — 3E0134Z INTRODUCTION OF SERUM, TOXOID AND VACCINE INTO SUBCUTANEOUS TISSUE, PERCUTANEOUS APPROACH: ICD-10-PCS | Performed by: OBSTETRICS & GYNECOLOGY

## 2024-04-20 PROCEDURE — 25000003 PHARM REV CODE 250: Performed by: OBSTETRICS & GYNECOLOGY

## 2024-04-20 RX ADMIN — METOCLOPRAMIDE 10 MG: 10 TABLET ORAL at 11:04

## 2024-04-20 RX ADMIN — MEASLES, MUMPS, AND RUBELLA VIRUS VACCINE LIVE 0.5 ML: 1000; 12500; 1000 INJECTION, POWDER, LYOPHILIZED, FOR SUSPENSION SUBCUTANEOUS at 05:04

## 2024-04-20 RX ADMIN — DOCUSATE SODIUM 200 MG: 100 CAPSULE, LIQUID FILLED ORAL at 09:04

## 2024-04-20 RX ADMIN — IBUPROFEN 800 MG: 400 TABLET ORAL at 11:04

## 2024-04-20 RX ADMIN — IBUPROFEN 800 MG: 400 TABLET ORAL at 05:04

## 2024-04-20 RX ADMIN — METOCLOPRAMIDE 10 MG: 10 TABLET ORAL at 09:04

## 2024-04-20 NOTE — PLAN OF CARE
VSS  Fundus firm and midline, below umbilicus with scant lochia  Low transverse incision with mepilex, c/d/I  Pain controlled with scheduled motrin and percocet as needed  Stephanie is ambulating independently, voiding, passing flatus and tolerating a regular diet  She is pumping/feeding and bonding with baby  She is happy to be discharged today

## 2024-04-20 NOTE — DISCHARGE SUMMARY
"[facility]  Obstetrics  Discharge Summary      Patient Name: Stephanie Moore  MRN: [id number]  Admission Date: 2024  Hospital Length of Stay: 3 days  Discharge Date and Time:  2024 10:21 AM  Attending Physician: Kalli Lane MD   Discharging Provider: Chelle Hook MD   Primary Care Provider: No, Primary Doctor    HPI: No notes on file        Procedure(s) (LRB):   SECTION, WITH TUBAL LIGATION (N/A)     Hospital Course:   28yo, 39.2 wks, previous birth trauma, desires primary CS and sterilization. Undergoes LTCS and BTL.   POD#3 ready for d/c  VSSAF  PE stable  Hg=10.5, rh pos  Meds per Dr. Lane  Consults (From admission, onward)          Status Ordering Provider     Inpatient consult to Anesthesiology  Once        Provider:  (Not yet assigned)    Acknowledged KALLI LANE            Final Active Diagnoses:    Diagnosis Date Noted POA    PRINCIPAL PROBLEM:  Delivery by elective  section [O82] 2024 Yes    Sterilization [Z30.2] 2024 Not Applicable      Problems Resolved During this Admission:        Significant Diagnostic Studies: Labs: All labs within the past 24 hours have been reviewed  Lab Results   Component Value Date    GROUPTRH B POS 2024         Feeding Method: breast    Immunizations       Date Immunization Status Dose Route/Site Given by    24 0502 MMR Given 0.5 mL Subcutaneous/Right arm Heather Morales RN    24 Tdap Deleted 0.5 mL Intramuscular/     24 Tdap Given 0.5 mL Intramuscular/Left arm Heather Morales RN            Delivery:    Episiotomy:     Lacerations:     Repair suture:     Repair # of packets:     Blood loss (ml):       Birth information:  YOB: 2026   Time of birth: 7:37 AM   Sex: male   Delivery type: , Low Transverse   Gestational Age: 39w2d     Measurements    Weight: 3485 g  Weight (lbs): 7 lb 10.9 oz  Length: 51.4 cm  Length (in): 20.25"  Head circumference: 36.5 " cm  Chest circumference: 32.5 cm  Abdominal girth: 32.5 cm         Delivery Clinician: Delivery Providers    Delivering clinician: Kalli Dias MD   Provider Role    Chelle Hook MD Assisting Surgeon    Tran Aviles, RN Registered Nurse    Elle Mayfield RN Registered Nurse    Cindy Alvarado RN Registered Nurse    Soha Slaughter Surgical Tech             Additional  information:  Forceps:    Vacuum:    Breech:    Observed anomalies      Living?:     Apgars    Living status: Living  Apgar Component Scores:  1 min.:  5 min.:  10 min.:  15 min.:  20 min.:    Skin color:  0  1       Heart rate:  2  2       Reflex irritability:  2  2       Muscle tone:  2  2       Respiratory effort:  2  2       Total:  8  9                Placenta: Delivered:       appearance  Pending Diagnostic Studies:       None            Discharged Condition: good    Disposition: Home or Self Care    Follow Up:    Patient Instructions:      Diet Adult Regular     Medications:  Current Discharge Medication List        START taking these medications    Details   ibuprofen (ADVIL,MOTRIN) 800 MG tablet Take 1 tablet (800 mg total) by mouth every 6 (six) hours as needed for Other.  Qty: 20 tablet, Refills: 2      metoclopramide HCl (REGLAN) 10 MG tablet Take 1 tablet (10 mg total) by mouth 4 (four) times daily.  Qty: 28 tablet, Refills: 1      oxyCODONE-acetaminophen (PERCOCET) 5-325 mg per tablet Take 1 tablet by mouth every 6 (six) hours as needed for Pain.  Qty: 20 tablet, Refills: 0    Comments: Quantity prescribed more than 7 day supply? No           STOP taking these medications       ferrous sulfate (FEOSOL) Tab tablet Comments:   Reason for Stopping:         multivitamin (ONE DAILY MULTIVITAMIN) per tablet Comments:   Reason for Stopping:               Chelle Hook MD  Obstetrics  [facility]

## 2024-04-20 NOTE — DISCHARGE INSTRUCTIONS
Pelvic rest for 6 weeks (no sex, tampons, douching, nothing in the vagina)    You can experience vaginal bleeding on and off for up to 6 weeks, it will gradually get lighter and the color will change from bright red to a brownish discharge towards the end.    Activity:  NO strenuous activities or exercising for 6 weeks.  Do not /lift anything over 15 pounds and no heavy housework or cleaning for 6 weeks.  Limit stair climbing to twice a day during the first 2 weeks.   NO driving for 4 weeks.  You may take short car trips but do not drive.    You may shower ONLY for the first 2 weeks, after 2 weeks you can soak in a bathtub.  Use a mild soap, no heavy perfumes or fragrances to avoid irritation.     Walking frequently following a  delivery promotes healing and decreases pain associated with gas.   If constipation develops:  You may take Colace (stool softener), Milk of Magnesia, Dulcolax or Miralax.  All of these medications are sold over the counter.      Incision Care:   Clean your incision with mild soap & warm water only- do not scrub- let warm water run over it, then pat dry.      Pain Relief:  You may take Motrin for mild pain & uterine cramping.      Emotional Changes:  You may experience baby blues after delivery.  You may feel let down, anxious and cry easily.  This is normal.  These feelings can begin 2-3 days after delivery and usually disappear in about a week or two.  Prolonged sadness may indicate postpartum depression.      Call your doctor for any of the following:  Difficulty breathing, problems with any of your medications, inability to eat.    Foul smelling vaginal discharge.  If you notice pus-like drainage from your incision, if your incision or the area around it becomes hot or swollen, or if you notice a foul smelling odor.  Temperature above 100.4.    Heavy vaginal bleeding.  All women bleed different after delivery and each delivery is different.  Heavy bleeding consists of  saturating a kasey pad in a 1 hour time period.  Passing clots are normal, if you pass a blood clot larger than the size of a golf ball call your doctor's office.   If you experience pain in your legs/calves, if one leg increases in size and becomes swollen or becomes hot to touch or discolored.   Crying or periods of sadness beyond 2 weeks.      If you are breast feeding:  Wash your breasts with mild soap and warm water.  You should wear a supportive bra.  You should continue to take a prenatal vitamin for 6 weeks or until breastfeeding is discontinued.  If nipples are sore, apply a few drops of breast milk after nursing and let air dry or you can use Lanolin cream.   If breasts are engorged, apply warmth and express milk.

## 2024-04-20 NOTE — NURSING
discharge instructions given to Stephanie, verbalizes understanding. Questions answered, no further questions. Prescriptions given in discharge folder.

## 2024-05-08 ENCOUNTER — PATIENT MESSAGE (OUTPATIENT)
Dept: NUTRITION | Facility: HOSPITAL | Age: 29
End: 2024-05-08

## (undated) DEVICE — DRESSING POST OP MEPILEX  AG  4X12

## (undated) DEVICE — Device

## (undated) DEVICE — BINDER 9 3-PANEL 30-45